# Patient Record
Sex: FEMALE | Race: WHITE | NOT HISPANIC OR LATINO | Employment: FULL TIME | URBAN - METROPOLITAN AREA
[De-identification: names, ages, dates, MRNs, and addresses within clinical notes are randomized per-mention and may not be internally consistent; named-entity substitution may affect disease eponyms.]

---

## 2023-04-03 PROBLEM — K64.8 INTERNAL HEMORRHOID: Status: ACTIVE | Noted: 2023-04-03

## 2023-04-03 PROBLEM — R76.8 POSITIVE ANA (ANTINUCLEAR ANTIBODY): Status: ACTIVE | Noted: 2023-04-03

## 2023-04-03 PROBLEM — B37.2 INTERTRIGINOUS CANDIDIASIS: Status: ACTIVE | Noted: 2023-04-03

## 2023-04-03 PROBLEM — K59.09 CHRONIC CONSTIPATION: Status: ACTIVE | Noted: 2023-04-03

## 2023-04-03 PROBLEM — L28.2 PRURITIC RASH: Status: ACTIVE | Noted: 2023-04-03

## 2023-04-03 PROBLEM — M15.9 POLYOSTEOARTHRITIS, UNSPECIFIED: Status: ACTIVE | Noted: 2023-04-03

## 2023-04-03 PROBLEM — L30.1 DYSHIDROTIC ECZEMA: Status: ACTIVE | Noted: 2023-04-03

## 2023-04-03 PROBLEM — E66.3 OVERWEIGHT WITH BODY MASS INDEX (BMI) 25.0-29.9: Status: ACTIVE | Noted: 2023-04-03

## 2023-04-03 PROBLEM — F17.210 CIGARETTE SMOKER ONE HALF PACK A DAY OR LESS: Status: ACTIVE | Noted: 2023-04-03

## 2023-04-03 RX ORDER — HYDROCORTISONE 25 MG/G
CREAM TOPICAL 2 TIMES DAILY
COMMUNITY

## 2023-04-03 RX ORDER — CELECOXIB 200 MG/1
1 CAPSULE ORAL DAILY
COMMUNITY
Start: 2019-01-28 | End: 2023-04-28

## 2023-04-03 RX ORDER — DICLOFENAC SODIUM 100 MG/1
1 TABLET, EXTENDED RELEASE ORAL DAILY
COMMUNITY
Start: 2019-10-09 | End: 2023-04-28

## 2023-04-03 RX ORDER — NYSTATIN 100000 U/G
OINTMENT TOPICAL
COMMUNITY
Start: 2019-10-09

## 2023-04-03 RX ORDER — CLOBETASOL PROPIONATE 0.5 MG/G
CREAM TOPICAL
COMMUNITY

## 2023-04-03 NOTE — PROGRESS NOTES
Raquel Mercado is a 62 y.o. female who presents today for c/o RIGHT   HAND/LITTLE FINGER INJURY     Chief Complaint   Patient presents with    Hand Injury     Raquel is here today with c/o Right hand injury that occurred on 3/29/23. Pt [place] stairs [place] a [place] and landed on her right hand dislocating her fingers. Pt went to ER and did complete xrays.      Pt states she fell at a casino last Wednesday, and braced herself with her RIGHT hand, states pain to RIGHT little finger, went to [facility] ED and was told she has a fracture; splint applied     Symptoms: PAIN to right little finger     Symptom onset has been acute for a time period of 6 day(s).     Severity is described as mild.     Course of her symptoms over time is acute.    Has taken IBUPROFEN     Pt states she called Dr. Salinas office, but he is out of town and was told to follow up with PCP and I could look at Xrays and determine next steps      Review of Systems  All 13 systems were reviewed and are within normal limits except positive and pertinent negative responses which are noted below or in HPI.      Objective   Vitals:  BP (!) 176/104   Pulse 76   Ht 1.524 m (5')   Wt 59 kg (130 lb)   BMI 25.39 kg/m²         Physical Exam  Vitals reviewed.   Musculoskeletal:         General: Swelling, tenderness and deformity present.      Comments: RIGHT little finger    Neurological:      Mental Status: She is alert.         Assessment/Plan   Problem List Items Addressed This Visit          Circulatory    Uncontrolled hypertension    Relevant Medications    losartan (Cozaar) 50 mg tablet       Musculoskeletal    Avulsion fracture    Relevant Orders    Referral to [facility]     Other Visit Diagnoses       Finger injury, right, sequela    -  Primary    Encounter for other screening for malignant neoplasm of breast        Relevant Orders    BI mammo bilateral screening tomosynthesis

## 2023-04-04 ENCOUNTER — OFFICE VISIT (OUTPATIENT)
Dept: PRIMARY CARE | Facility: CLINIC | Age: 63
End: 2023-04-04
Payer: COMMERCIAL

## 2023-04-04 VITALS
WEIGHT: 130 LBS | BODY MASS INDEX: 25.52 KG/M2 | HEIGHT: 60 IN | SYSTOLIC BLOOD PRESSURE: 176 MMHG | DIASTOLIC BLOOD PRESSURE: 104 MMHG | HEART RATE: 76 BPM

## 2023-04-04 DIAGNOSIS — I10 UNCONTROLLED HYPERTENSION: ICD-10-CM

## 2023-04-04 DIAGNOSIS — Z12.39 ENCOUNTER FOR OTHER SCREENING FOR MALIGNANT NEOPLASM OF BREAST: ICD-10-CM

## 2023-04-04 DIAGNOSIS — T14.8XXA AVULSION FRACTURE: ICD-10-CM

## 2023-04-04 DIAGNOSIS — S69.91XS FINGER INJURY, RIGHT, SEQUELA: Primary | ICD-10-CM

## 2023-04-04 PROCEDURE — 3077F SYST BP >= 140 MM HG: CPT | Performed by: NURSE PRACTITIONER

## 2023-04-04 PROCEDURE — 3080F DIAST BP >= 90 MM HG: CPT | Performed by: NURSE PRACTITIONER

## 2023-04-04 PROCEDURE — 99213 OFFICE O/P EST LOW 20 MIN: CPT | Performed by: NURSE PRACTITIONER

## 2023-04-04 RX ORDER — LOSARTAN POTASSIUM 50 MG/1
50 TABLET ORAL DAILY
Qty: 30 TABLET | Refills: 0 | Status: SHIPPED | OUTPATIENT
Start: 2023-04-04 | End: 2023-04-28 | Stop reason: SDUPTHER

## 2023-04-04 NOTE — PATIENT INSTRUCTIONS
Thank you for seeing me today.  It was a pleasure to see you again!    #AVULSION FRACTURE   Xray interpretation reviewed  See Ortho for FU    #HTN  Begin Losartan 50 mg daily  Your blood pressure should be </= 130/80.  Today your blood pressure was 176/104  You should avoid foods that are high in sodium and saturated fats  Exercise daily for at least 30 minutes  minutes/week  Avoid stressful situations as this can increase your blood pressure  Take your medications as prescribed--do not skip doses  Obtain a BP monitor and check your blood pressure at home. Check it around the same time each day, at least 1 hour after taking your medication.  Record your blood pressure in a log and  bring your log with you to your next appointment.    RTC 3 WEEKS

## 2023-04-14 LAB
COMPLEMENT C3 (MG/DL) IN SER/PLAS: 144 MG/DL (ref 87–200)
COMPLEMENT C4 (MG/DL) IN SER/PLAS: 38 MG/DL (ref 10–50)
THYROPEROXIDASE AB (IU/ML) IN SER/PLAS: 161 IU/ML

## 2023-04-17 LAB
ANTI-NUCLEAR ANTIBODY (ANA): NEGATIVE
CITRULLINE ANTIBODY, IGG: 2 UNITS (ref 0–19)

## 2023-04-19 LAB — HLAB27 TYPING: NEGATIVE

## 2023-04-28 ENCOUNTER — OFFICE VISIT (OUTPATIENT)
Dept: PRIMARY CARE | Facility: CLINIC | Age: 63
End: 2023-04-28
Payer: COMMERCIAL

## 2023-04-28 VITALS
SYSTOLIC BLOOD PRESSURE: 136 MMHG | HEART RATE: 82 BPM | DIASTOLIC BLOOD PRESSURE: 94 MMHG | BODY MASS INDEX: 25.52 KG/M2 | WEIGHT: 130 LBS | HEIGHT: 60 IN | OXYGEN SATURATION: 94 %

## 2023-04-28 DIAGNOSIS — F17.210 CIGARETTE SMOKER ONE HALF PACK A DAY OR LESS: ICD-10-CM

## 2023-04-28 DIAGNOSIS — I10 PRIMARY HYPERTENSION: Primary | ICD-10-CM

## 2023-04-28 DIAGNOSIS — I10 UNCONTROLLED HYPERTENSION: ICD-10-CM

## 2023-04-28 DIAGNOSIS — E66.3 OVERWEIGHT WITH BODY MASS INDEX (BMI) 25.0-29.9: ICD-10-CM

## 2023-04-28 PROBLEM — L28.2 PRURITIC RASH: Status: RESOLVED | Noted: 2023-04-03 | Resolved: 2023-04-28

## 2023-04-28 PROCEDURE — 99213 OFFICE O/P EST LOW 20 MIN: CPT | Performed by: NURSE PRACTITIONER

## 2023-04-28 PROCEDURE — 4004F PT TOBACCO SCREEN RCVD TLK: CPT | Performed by: NURSE PRACTITIONER

## 2023-04-28 PROCEDURE — 3075F SYST BP GE 130 - 139MM HG: CPT | Performed by: NURSE PRACTITIONER

## 2023-04-28 PROCEDURE — 3080F DIAST BP >= 90 MM HG: CPT | Performed by: NURSE PRACTITIONER

## 2023-04-28 RX ORDER — PETROLATUM,WHITE/LANOLIN
1 OINTMENT (GRAM) TOPICAL DAILY
COMMUNITY
Start: 2023-04-11

## 2023-04-28 RX ORDER — CHLORTHALIDONE 25 MG/1
25 TABLET ORAL DAILY
Qty: 90 TABLET | Refills: 1 | Status: SHIPPED | OUTPATIENT
Start: 2023-04-28 | End: 2023-10-03

## 2023-04-28 RX ORDER — LOSARTAN POTASSIUM 50 MG/1
50 TABLET ORAL DAILY
Qty: 90 TABLET | Refills: 1 | Status: SHIPPED | OUTPATIENT
Start: 2023-04-28 | End: 2023-10-03

## 2023-04-28 ASSESSMENT — PATIENT HEALTH QUESTIONNAIRE - PHQ9
SUM OF ALL RESPONSES TO PHQ9 QUESTIONS 1 AND 2: 0
2. FEELING DOWN, DEPRESSED OR HOPELESS: NOT AT ALL
1. LITTLE INTEREST OR PLEASURE IN DOING THINGS: NOT AT ALL

## 2023-04-28 NOTE — PROGRESS NOTES
"Subjective   Chief Complaint   Patient presents with    Follow-up     Raquel is here for 3 week follow up on high blood pressure and would like to discuss a calcium scan        Patient ID: Raquel Mercado is a 62 y.o. female who presents for Follow-up (Raquel is here for 3 week follow up on high blood pressure and would like to discuss a calcium scan )    HPI  61 yo est female presents today for 6 month f/u     PMH: CONSTIPATION, TOBACCO DEPENDANCY, HTN     #HTN  Rx Losartan 50 mg daily  Pt checks BP at home, states always \"130/90's\"  Currently daily cigarette smoker      #POLYOSTEOARTHRITIS  + ZAHEER, neg SOO   Saw Rheumatology, Dr. Maurer in April 2023  C/o \"plaque-like lesions\" on LE, ELBOWS AND STOMACH   + erythematous rash under b/l breasts  + erythematous rash and external hemorrhoids noted around anus      #CONSTIPATION, ,CHRONIC  Taking 3 stool softeners daily  doesn't consume foods high in fiber  does not exercise     #TOBACCO DEPENDANCY  Current everyday cigarette smoker   1/2 ppd x 45 yrs     #HM  MAMM: due   COLON: 2017  DEXA: never  FBW: UTD    Review of Systems  All 13 systems were reviewed and are within normal limits except positive and pertinent negative responses which are noted below or in HPI.      Objective   BP (!) 136/94   Pulse 82   Ht 1.524 m (5')   Wt 59 kg (130 lb)   SpO2 94%   BMI 25.39 kg/m²        Physical Exam  Vitals reviewed.   Constitutional:       Appearance: Normal appearance. She is normal weight.   HENT:      Right Ear: Tympanic membrane normal. There is no impacted cerumen.      Left Ear: Tympanic membrane normal. There is no impacted cerumen.      Mouth/Throat:      Mouth: Mucous membranes are moist.   Eyes:      Conjunctiva/sclera: Conjunctivae normal.   Cardiovascular:      Rate and Rhythm: Normal rate.      Pulses: Normal pulses.      Heart sounds: Normal heart sounds.   Pulmonary:      Effort: Pulmonary effort is normal.   Abdominal:      General: Abdomen is flat. " Bowel sounds are normal.      Palpations: Abdomen is soft.   Musculoskeletal:         General: Normal range of motion.   Skin:     General: Skin is warm and dry.      Capillary Refill: Capillary refill takes less than 2 seconds.   Neurological:      General: No focal deficit present.      Mental Status: She is alert.   Psychiatric:         Mood and Affect: Mood normal.         Assessment/Plan   Problem List Items Addressed This Visit          Circulatory    Primary hypertension - Primary    Relevant Medications    chlorthalidone (Hygroton) 25 mg tablet    Other Relevant Orders    CT cardiac scoring wo IV contrast       Endocrine/Metabolic    Overweight with body mass index (BMI) 25.0-29.9       Other    Cigarette smoker one half pack a day or less    Relevant Orders    CT lung screening low dose     Other Visit Diagnoses       Uncontrolled hypertension        Relevant Medications    losartan (Cozaar) 50 mg tablet

## 2023-04-28 NOTE — PATIENT INSTRUCTIONS
Thank you for seeing me today.  It was a pleasure to see you again!    #HTN  Your blood pressure should be </= 130/80.  Today your blood pressure was 136/94  C/w Losartan 50 mg and BEGIN Chlorthalidone 25 mg daily    You should avoid foods that are high in sodium and saturated fats  Exercise daily for at least 30 minutes  minutes/week  Avoid stressful situations as this can increase your blood pressure  Take your medications as prescribed--do not skip doses  Obtain a BP monitor and check your blood pressure at home. Check it around the same time each day, at least 1 hour after taking your medication.  Record your blood pressure in a log and  bring your log with you to your next appointment.    Work on quitting smoking    Schedule LDCT and CT calcium scoring test    RTC 6 MONTHS

## 2023-05-02 DIAGNOSIS — M15.9 PRIMARY OSTEOARTHRITIS INVOLVING MULTIPLE JOINTS: Primary | ICD-10-CM

## 2023-05-03 ENCOUNTER — LAB (OUTPATIENT)
Dept: LAB | Facility: LAB | Age: 63
End: 2023-05-03
Payer: COMMERCIAL

## 2023-05-03 DIAGNOSIS — M15.9 PRIMARY OSTEOARTHRITIS INVOLVING MULTIPLE JOINTS: ICD-10-CM

## 2023-05-03 PROBLEM — M41.26 OTHER IDIOPATHIC SCOLIOSIS, LUMBAR REGION: Status: ACTIVE | Noted: 2023-05-03

## 2023-05-03 PROCEDURE — 36415 COLL VENOUS BLD VENIPUNCTURE: CPT

## 2023-05-03 PROCEDURE — 84443 ASSAY THYROID STIM HORMONE: CPT

## 2023-05-04 LAB — THYROTROPIN (MIU/L) IN SER/PLAS BY DETECTION LIMIT <= 0.05 MIU/L: 2.35 MIU/L (ref 0.44–3.98)

## 2023-09-30 DIAGNOSIS — I10 PRIMARY HYPERTENSION: ICD-10-CM

## 2023-09-30 DIAGNOSIS — I10 UNCONTROLLED HYPERTENSION: ICD-10-CM

## 2023-10-03 RX ORDER — LOSARTAN POTASSIUM 50 MG/1
50 TABLET ORAL DAILY
Qty: 30 TABLET | Refills: 0 | Status: SHIPPED | OUTPATIENT
Start: 2023-10-03 | End: 2023-11-10 | Stop reason: SDUPTHER

## 2023-10-03 RX ORDER — CHLORTHALIDONE 25 MG/1
25 TABLET ORAL DAILY
Qty: 30 TABLET | Refills: 0 | Status: SHIPPED | OUTPATIENT
Start: 2023-10-03 | End: 2023-11-10 | Stop reason: SDUPTHER

## 2023-11-10 ENCOUNTER — ANCILLARY PROCEDURE (OUTPATIENT)
Dept: RADIOLOGY | Facility: CLINIC | Age: 63
End: 2023-11-10
Payer: COMMERCIAL

## 2023-11-10 ENCOUNTER — OFFICE VISIT (OUTPATIENT)
Dept: PRIMARY CARE | Facility: CLINIC | Age: 63
End: 2023-11-10
Payer: COMMERCIAL

## 2023-11-10 VITALS
DIASTOLIC BLOOD PRESSURE: 82 MMHG | WEIGHT: 126 LBS | HEART RATE: 71 BPM | SYSTOLIC BLOOD PRESSURE: 122 MMHG | BODY MASS INDEX: 24.74 KG/M2 | HEIGHT: 60 IN

## 2023-11-10 DIAGNOSIS — Z78.0 ASYMPTOMATIC AGE-RELATED POSTMENOPAUSAL STATE: ICD-10-CM

## 2023-11-10 DIAGNOSIS — I10 PRIMARY HYPERTENSION: ICD-10-CM

## 2023-11-10 DIAGNOSIS — Z12.39 ENCOUNTER FOR OTHER SCREENING FOR MALIGNANT NEOPLASM OF BREAST: ICD-10-CM

## 2023-11-10 DIAGNOSIS — M54.2 CERVICAL PAIN (NECK): ICD-10-CM

## 2023-11-10 DIAGNOSIS — B37.2 INTERTRIGINOUS CANDIDIASIS: ICD-10-CM

## 2023-11-10 DIAGNOSIS — I10 UNCONTROLLED HYPERTENSION: ICD-10-CM

## 2023-11-10 DIAGNOSIS — F17.210 TOBACCO DEPENDENCE DUE TO CIGARETTES: ICD-10-CM

## 2023-11-10 DIAGNOSIS — Z00.00 ANNUAL PHYSICAL EXAM: Primary | ICD-10-CM

## 2023-11-10 PROBLEM — M18.12 ARTHRITIS OF CARPOMETACARPAL (CMC) JOINT OF LEFT THUMB: Status: ACTIVE | Noted: 2023-11-10

## 2023-11-10 PROBLEM — M19.90 INFLAMMATORY ARTHRITIS: Status: ACTIVE | Noted: 2023-11-10

## 2023-11-10 PROBLEM — M19.041 PRIMARY OSTEOARTHRITIS, RIGHT HAND: Status: ACTIVE | Noted: 2023-11-10

## 2023-11-10 PROCEDURE — 77067 SCR MAMMO BI INCL CAD: CPT

## 2023-11-10 PROCEDURE — 3079F DIAST BP 80-89 MM HG: CPT | Performed by: NURSE PRACTITIONER

## 2023-11-10 PROCEDURE — 99213 OFFICE O/P EST LOW 20 MIN: CPT | Performed by: NURSE PRACTITIONER

## 2023-11-10 PROCEDURE — 72050 X-RAY EXAM NECK SPINE 4/5VWS: CPT

## 2023-11-10 PROCEDURE — 99396 PREV VISIT EST AGE 40-64: CPT | Performed by: NURSE PRACTITIONER

## 2023-11-10 PROCEDURE — 3074F SYST BP LT 130 MM HG: CPT | Performed by: NURSE PRACTITIONER

## 2023-11-10 PROCEDURE — 4004F PT TOBACCO SCREEN RCVD TLK: CPT | Performed by: NURSE PRACTITIONER

## 2023-11-10 PROCEDURE — 77080 DXA BONE DENSITY AXIAL: CPT

## 2023-11-10 RX ORDER — VARENICLINE TARTRATE 1 MG/1
1 TABLET, FILM COATED ORAL 2 TIMES DAILY
Qty: 154 TABLET | Refills: 0 | Status: SHIPPED | OUTPATIENT
Start: 2023-11-10

## 2023-11-10 RX ORDER — LOSARTAN POTASSIUM 50 MG/1
50 TABLET ORAL DAILY
Qty: 90 TABLET | Refills: 1 | Status: SHIPPED | OUTPATIENT
Start: 2023-11-10 | End: 2024-05-07 | Stop reason: SDUPTHER

## 2023-11-10 RX ORDER — CHLORTHALIDONE 25 MG/1
25 TABLET ORAL DAILY
Qty: 90 TABLET | Refills: 1 | Status: SHIPPED | OUTPATIENT
Start: 2023-11-10 | End: 2024-05-07 | Stop reason: SDUPTHER

## 2023-11-10 RX ORDER — CLOTRIMAZOLE AND BETAMETHASONE DIPROPIONATE 10; .64 MG/G; MG/G
CREAM TOPICAL 2 TIMES DAILY
Qty: 15 G | Refills: 1 | Status: SHIPPED | OUTPATIENT
Start: 2023-11-10 | End: 2023-12-08

## 2023-11-10 RX ORDER — VARENICLINE TARTRATE 0.5 MG/1
TABLET, FILM COATED ORAL
Qty: 11 TABLET | Refills: 0 | Status: SHIPPED | OUTPATIENT
Start: 2023-11-10

## 2023-11-10 RX ORDER — CLOTRIMAZOLE AND BETAMETHASONE DIPROPIONATE 10; .64 MG/G; MG/G
CREAM TOPICAL
COMMUNITY
End: 2023-11-10 | Stop reason: SDUPTHER

## 2023-11-10 NOTE — PROGRESS NOTES
"Subjective   Reason for Visit: Raquel Mercado is an 63 y.o. female here for a CPE     Chief Complaint   Patient presents with    Follow-up     Raquel is here today for yearly CPE and routine F/U.        HPI  Est 62 yo female presents today for Annual CPE     PMH: CONSTIPATION, TOBACCO DEPENDANCY, HTN     Pt states doing well  Has been hearing some \"cracking\" in her neck  No issues with ROM  Would like Xray     Pt states her job will be going out of business so she will be out of work and not have insurance ([employer])  Needs to get all testing done while she still has insurance   Has Mammogram later today     Pt is interested in quitting smoking   Would like to try Chantix      #HTN  Rx Losartan 50 mg daily  Pt checks BP at home, states always \"130/90's\"  Currently daily cigarette smoker   BP today= 122/82     #POLYOSTEOARTHRITIS  + ZAHEER, neg SOO   Saw Rheumatology, Dr. Maurer in April 2023  C/o \"plaque-like lesions\" on LE, ELBOWS AND STOMACH   + erythematous rash under b/l breasts  + erythematous rash and external hemorrhoids noted around anus      #CONSTIPATION, ,CHRONIC  Taking 3 stool softeners daily  doesn't consume foods high in fiber  does not exercise     #TOBACCO DEPENDANCY  Current everyday cigarette smoker   1/2 ppd x 45 yrs, currently smoking 5-6 cigs/day  Is interested in quitting      #HM  MAMM: due; ordered in April 2023  COLON: 2017  DEXA: never, ordered   FBW: due    No visits with results within 60 Day(s) from this visit.   Latest known visit with results is:   Lab on 05/03/2023   Component Date Value Ref Range Status    TSH 05/03/2023 2.35  0.44 - 3.98 mIU/L Final     TSH testing is performed using different testing    methodology at [facility] than at other    [facility]. Direct result comparisons should    only be made within the same method.         Patient Care Team:  REGINA Celis as PCP - General  REGINA Celis as PCP - MMO ACO PCP  Neelam HUMPHREYS " FRANSISCO Reed-CNP     Review of Systems  All 13 systems were reviewed and are within normal limits except positive and pertinent negative responses which are noted below or in HPI.      Objective   Vitals:  /82   Pulse 71   Ht 1.524 m (5')   Wt 57.2 kg (126 lb)   BMI 24.61 kg/m²       Physical Exam  Vitals reviewed.   Constitutional:       Appearance: Normal appearance.   Eyes:      Conjunctiva/sclera: Conjunctivae normal.   Cardiovascular:      Pulses: Normal pulses.      Heart sounds: Normal heart sounds.   Pulmonary:      Effort: Pulmonary effort is normal.      Breath sounds: Normal breath sounds.   Skin:     General: Skin is warm and dry.   Neurological:      Mental Status: She is alert.   Psychiatric:         Mood and Affect: Mood normal.         Assessment/Plan   Problem List Items Addressed This Visit             ICD-10-CM    Intertriginous candidiasis B37.2    Relevant Medications    clotrimazole-betamethasone (Lotrisone) cream    Primary hypertension I10    Relevant Medications    chlorthalidone (Hygroton) 25 mg tablet    Other Relevant Orders    Comprehensive Metabolic Panel    Lipid Panel     Other Visit Diagnoses         Codes    Annual physical exam    -  Primary Z00.00    Cervical pain (neck)     M54.2    Relevant Orders    XR cervical spine 2-3 views    Asymptomatic age-related postmenopausal state     Z78.0    Relevant Orders    XR DEXA bone density    Tobacco dependence due to cigarettes     F17.210    Relevant Medications    varenicline (Chantix) 0.5 mg tablet    varenicline (Chantix) 1 mg tablet    Uncontrolled hypertension     I10    Relevant Medications    losartan (Cozaar) 50 mg tablet             Patient was identified as a fall risk. Risk prevention instructions provided.

## 2023-11-10 NOTE — PATIENT INSTRUCTIONS
Thank you for seeing me today.  It was a pleasure to see you again!    Today we did your Annual Physical Exam and discussed the following:     Continue medications as prescribed/reviewed during OV today    Begin Chantix for smoking cessation     Lab work ordered today.  Please have your blood drawn in the next 1-2 weeks.  You need to be FASTING for 12 hours prior to blood draw.  You may only have water.  Please contact your insurance company to ask about the best location to get blood drawn.  We will contact you with the results of your blood work and any necessary adjustments  to your plan of care, if you do not hear from us within 3-5 days of having your blood drawn, please call the office at [phone].    Schedule DEXA     Neck Xray today when you get your mammogram done     RTC 6 MONTHS AND AS NEEDED           Ways to Help Prevent Falls at Home    Quick Tips   ? Ask for help if you need it. Most people want to help!   ? Get up slowly after sitting or laying down   ? Wear a medical alert device or keep cell phone in your pocket   ? Use night lights, especially areas near a bathroom   ? Keep the items you use often within reach on a small stool or end table   ? Use an assistive device such as walker or cane, as directed by provider/physical therapy   ? Use a non-slip mat and grab bars in your bathroom. Look for home health sections for best options     Other Areas to Focus On   ? Exercise and nutrition: Regular exercise or taking a falls prevention class are great ways improve strength and balance. Don’t forget to stay hydrated and bring a snack!   ? Medicine side effects: Some medicines can make you sleepy or dizzy, which could cause a fall. Ask your healthcare provider about the side effects your medicines could cause. Be sure to let them know if you take any vitamins or supplements as well.   ? Tripping hazards: Remove items you could trip on, such as loose mats, rugs, cords, and clutter. Wear closed toe  shoes with rubber soles.   ? Health and wellness: Get regular checkups with your healthcare provider, plus routine vision and hearing screenings. Talk with your healthcare provider about:   o Your medicines and the possible side effects - bring them in a bag if that is easier!   o Problems with balance or feeling dizzy   o Ways to promote bone health, such as Vitamin D and calcium supplements   o Questions or concerns about falling     *Ask your healthcare team if you have questions     ©[facility], 2022

## 2023-11-12 DIAGNOSIS — M43.10 RETROLISTHESIS: ICD-10-CM

## 2023-11-12 DIAGNOSIS — M43.12 ANTEROLISTHESIS OF CERVICAL SPINE: Primary | ICD-10-CM

## 2023-11-12 PROBLEM — M81.0 AGE-RELATED OSTEOPOROSIS WITHOUT CURRENT PATHOLOGICAL FRACTURE: Status: ACTIVE | Noted: 2023-11-12

## 2023-11-12 NOTE — RESULT ENCOUNTER NOTE
Raquel,  Your next xrays show some moderate and severe changes around C5-C7. I would recommend going to see ortho specialist for further evaluation and they can pursue CT or MRI based on their exam.

## 2023-11-12 NOTE — RESULT ENCOUNTER NOTE
Raquel,   Your DEXA shows osteoporosis, we need to discuss treatment options, please call office to schedule phone visit to discuss results/treatment options.     Thanks  Erlinda

## 2023-11-14 NOTE — PROGRESS NOTES
Subjective   Chief Complaint   Patient presents with    Results     DEXA results        Patient ID: Raquel Mercado is a 63 y.o. female who presents for Results (DEXA results ).    HPI  Est 64 yo female seen virtually today to discuss DEXA results      Review of Systems  All 13 systems were reviewed and are within normal limits except positive and pertinent negative responses which are noted below or in HPI.      Objective   There were no vitals taken for this visit.   XR DEXA bone density 11/10/2023    Narrative  Interpreted By:  Hermelindo Bowen,  STUDY:  DEXA BONE ECLOUCY69/10/2023 4:28 pm    INDICATION:  Signs/Symptoms:screening. The patient is a 64 y/o  year old F.    COMPARISON:  None.    ACCESSION NUMBER(S):  [id number]    ORDERING CLINICIAN:  TINO PARADA    TECHNIQUE:  DEXA BONE DENSITY    FINDINGS:  SPINE L1-L4  Bone Mineral Density: 0.796 g/cm2  T-Score -1.7  Z-Score -0.2  Bone Mineral Density change vs baseline:  Bone Mineral Density change vs previous: Not reported    LEFT FEMUR -TOTAL  Bone Mineral Density: 0.679 g/cm2  T-Score -2.2   Z-Score  -1.1  Bone Mineral Density change vs baseline: Not reported  Bone Mineral Density change vs previous: Not reported    LEFT FEMUR -NECK  Bone Mineral Density: 0.553 g/cm2  T-Score -2.7  Z-Score -1.3      World Health Organization (WHO) criteria for post-menopausal,   Women:  Normal:         T-score at or above -1 SD  Osteopenia:   T-score between -1 and -2.5 SD  Osteoporosis: T-score at or below -2.5 SD      10-year Fracture Risk:  Major Osteoporotic Fracture  13 %  Hip Fracture                        2.5 %    Note:  If no FRAX score is reported, it is because:  Some T-score for Spine Total or Hip Total or Femoral Neck at or below  -2.5    Impression  DEXA:  According to World Health Organization criteria,  classification is osteoporosis.    Followup recommended in two years or sooner as clinically warranted.    All images and detailed analysis are  available on the  Radiology  PACS.    MACRO:  None      Signed by: Hermelindo Bowen 11/11/2023 9:28 AM  Dictation workstation:   [id number]       Physical Exam  Pulmonary:      Effort: Pulmonary effort is normal.   Neurological:      Mental Status: She is alert.   Psychiatric:         Mood and Affect: Mood normal.         Thought Content: Thought content normal.         Assessment/Plan   Problem List Items Addressed This Visit             ICD-10-CM    Age-related osteoporosis without current pathological fracture - Primary M81.0    Relevant Medications    alendronate (Fosamax) 70 mg tablet

## 2023-11-15 ENCOUNTER — TELEMEDICINE (OUTPATIENT)
Dept: PRIMARY CARE | Facility: CLINIC | Age: 63
End: 2023-11-15
Payer: COMMERCIAL

## 2023-11-15 DIAGNOSIS — M81.0 AGE-RELATED OSTEOPOROSIS WITHOUT CURRENT PATHOLOGICAL FRACTURE: Primary | ICD-10-CM

## 2023-11-15 PROCEDURE — 99213 OFFICE O/P EST LOW 20 MIN: CPT | Performed by: NURSE PRACTITIONER

## 2023-11-15 RX ORDER — ALENDRONATE SODIUM 70 MG/1
70 TABLET ORAL
Qty: 4 TABLET | Refills: 11 | Status: SHIPPED | OUTPATIENT
Start: 2023-11-15 | End: 2024-11-14

## 2023-11-15 NOTE — PATIENT INSTRUCTIONS
Thank you for seeing me today.  It was a pleasure to see you again!    #OSTEOPOROSIS  Results reviewed via phone today  Pt agrees to begin Fosamax 70 mg weekly  Side effects reviewed with pt  Repeat DEXA in 2 yrs    F/U AS RECOMMENDED

## 2023-11-18 ENCOUNTER — LAB (OUTPATIENT)
Dept: LAB | Facility: LAB | Age: 63
End: 2023-11-18
Payer: COMMERCIAL

## 2023-11-18 DIAGNOSIS — I10 PRIMARY HYPERTENSION: ICD-10-CM

## 2023-11-18 LAB
ALBUMIN SERPL BCP-MCNC: 4.8 G/DL (ref 3.4–5)
ALP SERPL-CCNC: 62 U/L (ref 33–136)
ALT SERPL W P-5'-P-CCNC: 17 U/L (ref 7–45)
ANION GAP SERPL CALC-SCNC: 12 MMOL/L (ref 10–20)
AST SERPL W P-5'-P-CCNC: 19 U/L (ref 9–39)
BILIRUB SERPL-MCNC: 0.4 MG/DL (ref 0–1.2)
BUN SERPL-MCNC: 19 MG/DL (ref 6–23)
CALCIUM SERPL-MCNC: 10.1 MG/DL (ref 8.6–10.6)
CHLORIDE SERPL-SCNC: 102 MMOL/L (ref 98–107)
CHOLEST SERPL-MCNC: 225 MG/DL (ref 0–199)
CHOLESTEROL/HDL RATIO: 2.9
CO2 SERPL-SCNC: 30 MMOL/L (ref 21–32)
CREAT SERPL-MCNC: 0.68 MG/DL (ref 0.5–1.05)
GFR SERPL CREATININE-BSD FRML MDRD: >90 ML/MIN/1.73M*2
GLUCOSE SERPL-MCNC: 91 MG/DL (ref 74–99)
HDLC SERPL-MCNC: 76.5 MG/DL
LDLC SERPL CALC-MCNC: 139 MG/DL
NON HDL CHOLESTEROL: 149 MG/DL (ref 0–149)
POTASSIUM SERPL-SCNC: 4 MMOL/L (ref 3.5–5.3)
PROT SERPL-MCNC: 6.8 G/DL (ref 6.4–8.2)
SODIUM SERPL-SCNC: 140 MMOL/L (ref 136–145)
TRIGL SERPL-MCNC: 49 MG/DL (ref 0–149)
VLDL: 10 MG/DL (ref 0–40)

## 2023-11-18 PROCEDURE — 36415 COLL VENOUS BLD VENIPUNCTURE: CPT

## 2023-11-18 PROCEDURE — 80053 COMPREHEN METABOLIC PANEL: CPT

## 2023-11-18 PROCEDURE — 80061 LIPID PANEL: CPT

## 2023-11-29 ENCOUNTER — OFFICE VISIT (OUTPATIENT)
Dept: ORTHOPEDIC SURGERY | Facility: CLINIC | Age: 63
End: 2023-11-29
Payer: COMMERCIAL

## 2023-11-29 DIAGNOSIS — M41.50 DEGENERATIVE SCOLIOSIS IN ADULT PATIENT: ICD-10-CM

## 2023-11-29 DIAGNOSIS — S16.1XXA CERVICAL STRAIN, ACUTE, INITIAL ENCOUNTER: Primary | ICD-10-CM

## 2023-11-29 DIAGNOSIS — M43.12 ANTEROLISTHESIS OF CERVICAL SPINE: ICD-10-CM

## 2023-11-29 DIAGNOSIS — M43.10 RETROLISTHESIS: ICD-10-CM

## 2023-11-29 PROCEDURE — 99214 OFFICE O/P EST MOD 30 MIN: CPT | Performed by: ORTHOPAEDIC SURGERY

## 2023-11-29 PROCEDURE — 4004F PT TOBACCO SCREEN RCVD TLK: CPT | Performed by: ORTHOPAEDIC SURGERY

## 2023-11-29 ASSESSMENT — PAIN SCALES - GENERAL: PAINLEVEL_OUTOF10: 4

## 2023-11-29 ASSESSMENT — PAIN DESCRIPTION - DESCRIPTORS: DESCRIPTORS: ACHING;SORE

## 2023-11-29 ASSESSMENT — PAIN - FUNCTIONAL ASSESSMENT: PAIN_FUNCTIONAL_ASSESSMENT: 0-10

## 2023-11-29 NOTE — PROGRESS NOTES
Chief Complaint: Neck pain and low back pain    HPI: Raquel Mercado is a 63 y.o. year old female patient with PMH significant for hypertension and osteoporosis who presents with 2-month history of neck pain and chronic low back pain.  In terms of her neck pain she states that this started couple months ago.  No associated trauma or injury.  Complains of stiffness and clicking sound in her neck.  Some pain along the paraspinal and trapezius more on the left than the right.  Denies any radicular arm pain numbness tingling or paresthesias.  Denies any myelopathic symptoms.  Denies any difficulty with the use of her hands but she does have some numbness in her hands from carpal tunnel.  Denies any difficulty with ambulation.  Denies any bowel or bladder symptoms.  She takes Tylenol and ibuprofen as needed.  Has not done any formal physical therapy for this.    In terms of her low back pain states that this been a chronic issue usually worse in the morning feels better when she starts moving.  Complains of pain over the lumbosacral region.  Denies any radicular leg pain numbness tingling or paresthesias.  She takes ibuprofen and Tylenol for her back pain as well.  Has not done any formal physical therapy.  She does not follow any specific exercise program.      She does smoke about 5 cigarettes a day for the past 40 years she is working on quitting.  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 All pertinent positive and negative as listed in history of present illness.    Past Medical History:   Diagnosis Date    Candidiasis of skin and nail 10/09/2019    Skin yeast infection    Encounter for other preprocedural examination 11/01/2014    Preoperative examination    Other conditions influencing health status 06/07/2013    Foot pain, unspecified laterality    Pain in right toe(s)  10/09/2019    Pain and swelling of toe of right foot    Personal history of other specified conditions 12/12/2013    History of vertigo    Stress incontinence (female) (male) 01/28/2019    Stress incontinence, female    Tobacco abuse counseling 11/25/2022    Tobacco abuse counseling    Uncontrolled hypertension 04/04/2023        Past Surgical History:   Procedure Laterality Date    OTHER SURGICAL HISTORY  11/25/2022    Carpal tunnel surgery    OTHER SURGICAL HISTORY  11/25/2022    Foot surgery        No Known Allergies     Current Outpatient Medications on File Prior to Visit   Medication Sig Dispense Refill    alendronate (Fosamax) 70 mg tablet Take 1 tablet (70 mg) by mouth every 7 days. Take in the morning with a full glass of water, on an empty stomach, and do not take anything else by mouth or lie down for the next 30 min. 4 tablet 11    chlorthalidone (Hygroton) 25 mg tablet Take 1 tablet (25 mg) by mouth once daily. 90 tablet 1    clobetasol (Temovate) 0.05 % cream Clobetasol Propionate 0.05 % External Cream   Refills: 0       Active      clotrimazole-betamethasone (Lotrisone) cream Apply topically 2 times a day for 28 days. 15 g 1    diclofenac sodium 1 % kit Place on the skin. APPLY SPARINGLY TO AFFECTED AREA(S) four times DAILY      estrogens, conjugated, (Premarin) vaginal cream Insert into the vagina.      glucosamine sulfate 500 mg capsule Take 1 tablet by mouth once daily.      hydrocortisone 2.5 % cream Apply topically 2 times a day. APPLY SPARINGLY TO AFFECTED AREA      losartan (Cozaar) 50 mg tablet Take 1 tablet (50 mg) by mouth once daily. 90 tablet 1    varenicline (Chantix) 0.5 mg tablet TAKE 1 TABLET DAILY X 3 DAYS, THEN TAKE 2 TABLETS DAILY x 4 DAYS, THEN BEGIN 1 MG TABLET TWICE DAILY X 11 WEEKS 11 tablet 0    varenicline (Chantix) 1 mg tablet Take 1 tablet (1 mg) by mouth 2 times a day. Take with full glass of water. 154 tablet 0    nystatin (Mycostatin) ointment Apply topically. Apply to  affected areas 2-3 times daily       No current facility-administered medications on file prior to visit.            PE:   General: Patient appears well-nourished and well-developed in no acute distress, Alert and Oriented x3, petite female  Psych: Pleasant mood and affect  HEENT: Extraocular muscles intact, pupils equal and round. Sclerae anicteric   Cardio: extremities warm and well perfused  Resp: unlabored symmetric breathing  Skin: no open wounds or rash  Musculoskeletal/Neuro Exam: Normal gait.  Mild tenderness to palpation along the paraspinal and trapezius along the cervical spine bilaterally.  No pain with palpation over the lumbar spine.  Nonpainful cervical range of motion.  Negative Spurlings.  Negative Lhermitte's Sign.  Negative straight leg raise bilaterally tight hamstrings bilaterally.  No groin pain with range of motion of the hip joints bilaterally.    Upper extremity:    Motor: Right upper extremity was 5 out of 5 strength with shoulder abduction, elbow flexion and extension, wrist flexion and extension and  against resistance  Left upper extremity with 5 out of 5 strength with shoulder abduction, elbow flexion and extension, wrist flexion and extension and  against resistance    Sensation to light touch intact along C5-T1 distribution bilaterally    Reflex: 2+ brachioradialis and biceps bilaterally    Upper Motor Signs: Negative Real sign bilaterally    Lower extremity    Motor: Right leg with 5 out of 5 motor strength with hip flexion, knee extension, ankle dorsiflexion plantarflexion EHL against resistance  Left leg with 5 out of 5 motor strength with hip flexion, knee extension, ankle dorsiflexion plantarflexion EHL against resistance    Sensation to light touch intact along L2-S1 distribution bilaterally    2+ patella and Achilles Reflex          Imaging:    I reviewed x-rays of the cervical spine from November 10, 2023.  AP lateral oblique views of the cervical spine.  No acute  fractures.  There is spondylotic changes at the space narrowing between C4-C5 C5-C6 C6-C7.  There is anterolisthesis of C4 and C5 with facet arthrosis.    I reviewed x-rays of the lumbar spine from April 14, 2023.  AP lateral views.  Patient with degenerative scoliosis of the lumbar spine with Loza angle about 37 degrees measuring from T11 down to L4.  There is disc space narrowing facet arthrosis between L2-L3 L3-L4 L4-L5 no acute fractures.          A/P: Raquel Mercado is a 63 y.o. year old female patient with history of hypertension and osteoporosis who is about to start Fosamax in December.  She was seen today for 2-month history of neck pain and chronic axial low back pain.  In regards to her neck pain her pain seems to be more along the paraspinal and trapezius likely more cervical strain x-ray does show degenerative changes and spondylosis.  She otherwise has no radicular symptoms or myelopathic symptoms.    In regards to her low back pain she does have degenerative scoliosis of the lumbar spine and otherwise denies any claudication symptoms or radicular leg pain she is otherwise neurologically intact.  Seems to be related to stiffness when she is moving early in the morning feels better when she starts moving.  I recommend a course of physical therapy for both her neck and her lower back to work on range of motion stretching and strengthening exercises.  I advised her to continue these exercises even once she has completed with therapy.  I also strongly advised that she stop smoking as that can accelerate her arthritis.  She was given referral for therapy today as well as phone numbers to call and schedule.  She can follow-up as needed. After our discussion, the patient articulated understanding of the plan and felt that all questions had been answered satisfactorily. The patient was pleased with the visit and very appreciative for the care rendered.    **Please excuse any errors in grammar or translation  related to this dictation. Voice recognition software was utilized to prepare this document. **                    Leda Nugyen MD    Department of Orthopaedic Surgery  [facility]  [email or web address]

## 2024-05-07 ENCOUNTER — APPOINTMENT (OUTPATIENT)
Dept: PRIMARY CARE | Facility: CLINIC | Age: 64
End: 2024-05-07
Payer: COMMERCIAL

## 2024-05-07 DIAGNOSIS — I10 UNCONTROLLED HYPERTENSION: ICD-10-CM

## 2024-05-07 DIAGNOSIS — I10 PRIMARY HYPERTENSION: ICD-10-CM

## 2024-05-07 RX ORDER — LOSARTAN POTASSIUM 50 MG/1
50 TABLET ORAL DAILY
Qty: 90 TABLET | Refills: 1 | Status: SHIPPED | OUTPATIENT
Start: 2024-05-07 | End: 2024-05-23 | Stop reason: SDUPTHER

## 2024-05-07 RX ORDER — CHLORTHALIDONE 25 MG/1
25 TABLET ORAL DAILY
Qty: 90 TABLET | Refills: 1 | Status: SHIPPED | OUTPATIENT
Start: 2024-05-07 | End: 2024-05-23 | Stop reason: SDUPTHER

## 2024-05-22 NOTE — PROGRESS NOTES
"Subjective   Chief Complaint   Patient presents with    Hypertension     Patient coming in today for a follow up for hypertension. She is currently taking Losartan 50 mg daily. Patient Quit smoking cigarettes 12/7/23.       Patient ID: Raquel Mercado is a 63 y.o. female who presents for Hypertension (Patient coming in today for a follow up for hypertension. She is currently taking Losartan 50 mg daily. Patient Quit smoking cigarettes 12/7/23.).    HPI  Raquel is a 64 yo female presenting today for 6 month f/u on HTN     Pt used to work at [employer] in [place]--> closed   Is now working as an assistant at another [employer]    No acute c/o today  Quit smoking in Dec 2023  Feels great   Wants to work on weight loss     #HTN  Rx Losartan 50 mg and Chlorthalidone 25 mg daily  BP today= 115/81  Quit smoking in Dec 2023, used Chantix        #POLYOSTEOARTHRITIS  + ZAHEER, neg SOO   Saw Rheumatology, Dr. Maurer in April 2023  C/o \"plaque-like lesions\" on LE, ELBOWS AND STOMACH   + erythematous rash under b/l breasts  + erythematous rash and external hemorrhoids noted around anus      #CONSTIPATION, ,CHRONIC  Taking 3 stool softeners daily  doesn't consume foods high in fiber  does not exercise     #TOBACCO DEPENDANCY  Former everyday cigarette smoker---> quit Dec 2023   1/2 ppd x 45 yrs      #HM  MAMM: UTD  COLON: 2017  DEXA: 2023  FBW: UTD    No Known Allergies    Review of Systems  ROS was completed and all systems are negative with the exception of what was noted in the the HPI.       Objective   /81   Pulse 68   Ht 1.524 m (5')   Wt 58.4 kg (128 lb 12.8 oz)   SpO2 96%   BMI 25.15 kg/m²      Current Outpatient Medications   Medication Instructions    alendronate (FOSAMAX) 70 mg, oral, Every 7 days, Take in the morning with a full glass of water, on an empty stomach, and do not take anything else by mouth or lie down for the next 30 min.    chlorthalidone (HYGROTON) 25 mg, oral, Daily    clobetasol (Temovate) " 0.05 % cream Clobetasol Propionate 0.05 % External Cream   Refills: 0       Active    estrogens, conjugated, (Premarin) vaginal cream vaginal    glucosamine sulfate 500 mg capsule 1 tablet, oral, Daily    hydrocortisone 2.5 % cream Topical, 2 times daily, APPLY SPARINGLY TO AFFECTED AREA    losartan (COZAAR) 50 mg, oral, Daily    nystatin (Mycostatin) ointment Topical, Apply to affected areas 2-3 times daily    varenicline (Chantix) 0.5 mg tablet TAKE 1 TABLET DAILY X 3 DAYS, THEN TAKE 2 TABLETS DAILY x 4 DAYS, THEN BEGIN 1 MG TABLET TWICE DAILY X 11 WEEKS    varenicline (CHANTIX) 1 mg, oral, 2 times daily, Take with full glass of water.         Physical Exam  Vitals reviewed.   Cardiovascular:      Pulses: Normal pulses.      Heart sounds: Normal heart sounds.   Pulmonary:      Effort: Pulmonary effort is normal.      Breath sounds: Normal breath sounds.   Neurological:      Mental Status: She is alert and oriented to person, place, and time.   Psychiatric:         Mood and Affect: Mood normal.         Assessment/Plan   Problem List Items Addressed This Visit             ICD-10-CM    Primary hypertension - Primary I10     Rx Losartan 50 mg and chlorthalidone 25 mg daily  BP today= 115/81  Quit smoking cigarettes in Dec 2023            Relevant Medications    chlorthalidone (Hygroton) 25 mg tablet     Other Visit Diagnoses         Codes    Uncontrolled hypertension     I10    Relevant Medications    losartan (Cozaar) 50 mg tablet

## 2024-05-23 ENCOUNTER — OFFICE VISIT (OUTPATIENT)
Dept: PRIMARY CARE | Facility: CLINIC | Age: 64
End: 2024-05-23
Payer: COMMERCIAL

## 2024-05-23 VITALS
DIASTOLIC BLOOD PRESSURE: 81 MMHG | OXYGEN SATURATION: 96 % | BODY MASS INDEX: 25.29 KG/M2 | SYSTOLIC BLOOD PRESSURE: 115 MMHG | HEART RATE: 68 BPM | WEIGHT: 128.8 LBS | HEIGHT: 60 IN

## 2024-05-23 DIAGNOSIS — I10 UNCONTROLLED HYPERTENSION: ICD-10-CM

## 2024-05-23 DIAGNOSIS — I10 PRIMARY HYPERTENSION: Primary | ICD-10-CM

## 2024-05-23 PROCEDURE — 3079F DIAST BP 80-89 MM HG: CPT | Performed by: NURSE PRACTITIONER

## 2024-05-23 PROCEDURE — 3074F SYST BP LT 130 MM HG: CPT | Performed by: NURSE PRACTITIONER

## 2024-05-23 PROCEDURE — 99212 OFFICE O/P EST SF 10 MIN: CPT | Performed by: NURSE PRACTITIONER

## 2024-05-23 RX ORDER — CHLORTHALIDONE 25 MG/1
25 TABLET ORAL DAILY
Qty: 90 TABLET | Refills: 1 | Status: SHIPPED | OUTPATIENT
Start: 2024-05-23

## 2024-05-23 RX ORDER — LOSARTAN POTASSIUM 50 MG/1
50 TABLET ORAL DAILY
Qty: 90 TABLET | Refills: 1 | Status: SHIPPED | OUTPATIENT
Start: 2024-05-23

## 2024-05-23 NOTE — ASSESSMENT & PLAN NOTE
Rx Losartan 50 mg and chlorthalidone 25 mg daily  BP today= 115/81  Quit smoking cigarettes in Dec 2023

## 2024-05-23 NOTE — PATIENT INSTRUCTIONS
Thank you for seeing me today.  It was a pleasure to see you again!    Great job quitting smoking     #HTN  Your blood pressure should be </= 130/80.  Today your blood pressure was 115/81  Continue Losartan 50 mg daily   You should avoid foods that are high in sodium and saturated fats  Exercise daily for at least 30 minutes  minutes/week  Avoid stressful situations as this can increase your blood pressure  Take your medications as prescribed--do not skip doses  Obtain a BP monitor and check your blood pressure at home. Check it around the same time each day, at least 1 hour after taking your medication.  Record your blood pressure in a log and  bring your log with you to your next appointment.    For assistance with scheduling referrals or consultations, please call [phone] or [phone].    For laboratory, radiology, and other tests, please call [phone] ([phone] for pediatrics).   If you do not get results within 7-10 days, or you have any questions or concerns, please send a message, call the office ([phone]), or return to the office for a follow-up appointment.     For acute/sick visits, if you are unable to get an office visit, you can do a  On Demand Virtual Visit that is accessible via your My Chart account.  For emergencies, call 9-1-1 or go to the nearest Emergency Department.     Please schedule additional appointment(s) to address concern(s) not addressed today.    Please review prescription inserts and published information for possible adverse effects of all medications.     In general, results are discussed over the phone or via  [facility].     You can see your health information, review clinical summaries from office visits & test results online when you follow your health with MY  Chart, a personal health record.   To sign up go to [email or web address].   If you need assistance with signing up or trouble getting into your account call [facility] Patient Line 24/7  at [phone]     RTC 6 MONTHS FOR CPE

## 2024-11-11 DIAGNOSIS — I10 UNCONTROLLED HYPERTENSION: ICD-10-CM

## 2024-11-11 DIAGNOSIS — I10 PRIMARY HYPERTENSION: ICD-10-CM

## 2024-11-11 DIAGNOSIS — M81.0 AGE-RELATED OSTEOPOROSIS WITHOUT CURRENT PATHOLOGICAL FRACTURE: ICD-10-CM

## 2024-11-11 RX ORDER — CHLORTHALIDONE 25 MG/1
25 TABLET ORAL DAILY
Qty: 90 TABLET | Refills: 1 | Status: SHIPPED | OUTPATIENT
Start: 2024-11-11

## 2024-11-11 RX ORDER — LOSARTAN POTASSIUM 50 MG/1
50 TABLET ORAL DAILY
Qty: 90 TABLET | Refills: 1 | Status: SHIPPED | OUTPATIENT
Start: 2024-11-11

## 2024-11-11 RX ORDER — ALENDRONATE SODIUM 70 MG/1
70 TABLET ORAL
Qty: 4 TABLET | Refills: 11 | Status: SHIPPED | OUTPATIENT
Start: 2024-11-11 | End: 2025-11-11

## 2024-11-26 NOTE — PROGRESS NOTES
"Subjective   Reason for Visit: Raquel Mercado is an 64 y.o. female here for a CPE     Chief Complaint   Patient presents with    Annual Exam     Raquel Mercado is a 64 y.o. female is here today for a CPE. Patient reports getting leg cramps.         HPI  Raquel is a 63 yo female presenting today for Annual CPE     Dx: OSTEOPOROSIS, HTN, FORMER TOBACCO USER    Pt used to work at [employer] in [place]--> dealership closed  Is now working as an assistant at another car dealership    No acute c/o today  Declines flu and Tdap today    Is due for Mammogram, LDCT and FBW     #CPE   Occupation:  at car dealership     Do you take any herbs or supplements that were not prescribed by a doctor? Denies     Colon Cancer Screening: UTD, due 2027    LMP: menopause   PAP: 2022  Mammogram: Due   GYN: Dr Mcgowan     Fasting blood work: Due   HIV/HEP C Screening: Due   Last eye exam: 2024  Last dental Exam: 2024  Exercise: not regularly   Mood: no concerns   Sleep: no concerns   Vaccines: declines flu vaccine     #HTN  Rx Losartan 50 mg and Chlorthalidone 25 mg daily  BP today= 106/74  Quit smoking in Dec 2023, used Chantix     #OSTEOPOROSIS  Dx'd 2023  Rx Fosamax weekly---> started in 2023   No fractures      #POLYOSTEOARTHRITIS  + ZAHEER, neg SOO   Saw Rheumatology, Dr. Maurer in April 2023  C/o \"plaque-like lesions\" on LE, ELBOWS AND STOMACH   + erythematous rash under b/l breasts  + erythematous rash and external hemorrhoids noted around anus      #CONSTIPATION, ,CHRONIC  Taking 3 stool softeners daily  doesn't consume foods high in fiber  does not exercise     #TOBACCO DEPENDANCY  Former everyday cigarette smoker---> quit Dec 2023   1/2 ppd x 45 yrs (23 pack yrs)  LDCT 6/2023---> 3 mm nodule   Pt agrees to cont with annual surveillance       Health Maintenance Due   Topic Date Due    HIV Screening  Never done    MMR Vaccines (1 of 1 - Standard series) Never done    Pneumococcal Vaccine: Pediatrics (0 to 5 Years) and " At-Risk Patients (6 to 64 Years) (1 of 2 - PCV) Never done    Hepatitis C Screening  Never done    Diabetes Screening  Never done    Zoster Vaccines (1 of 2) Never done    RSV High Risk: (Elderly (60+) or Pregnant Population) (1 - Risk 60-74 years 1-dose series) Never done    DTaP/Tdap/Td Vaccines (2 - Td or Tdap) 03/21/2024    Influenza Vaccine (1) Never done    COVID-19 Vaccine (1 - 2024-25 season) Never done    Mammogram  11/10/2024       No Known Allergies      Patient Care Team:  REGINA Celis as PCP - General  REGINA Celis as PCP - Kevin O PCP  REGINA Celis     Review of Systems  ROS was completed and all systems are negative with the exception of what was noted in the the HPI.       Objective   Vitals:  /74   Pulse 70   Ht 1.524 m (5')   Wt 59.5 kg (131 lb 3.2 oz)   SpO2 96%   BMI 25.62 kg/m²       Current Outpatient Medications   Medication Instructions    alendronate (FOSAMAX) 70 mg, oral, Every 7 days, Take in the morning with a full glass of water, on an empty stomach, and do not take anything else by mouth or lie down for the next 30 min.    chlorthalidone (HYGROTON) 25 mg, oral, Daily    clobetasol (Temovate) 0.05 % cream Clobetasol Propionate 0.05 % External Cream   Refills: 0       Active    estrogens, conjugated, (Premarin) vaginal cream Insert into the vagina.    glucosamine sulfate 500 mg capsule 1 tablet, Daily    hydrocortisone 2.5 % cream 2 times daily    losartan (COZAAR) 50 mg, oral, Daily       Physical Exam  Vitals reviewed.   HENT:      Right Ear: Tympanic membrane normal.      Left Ear: Tympanic membrane normal.      Mouth/Throat:      Mouth: Mucous membranes are moist.   Eyes:      Conjunctiva/sclera: Conjunctivae normal.   Cardiovascular:      Pulses: Normal pulses.      Heart sounds: Normal heart sounds.   Pulmonary:      Effort: Pulmonary effort is normal.      Breath sounds: Normal breath sounds.   Skin:     General: Skin is warm and  dry.   Neurological:      Mental Status: She is alert and oriented to person, place, and time.   Psychiatric:         Mood and Affect: Mood normal.         Assessment & Plan  Annual physical exam  - Counseled on healthy diet and regular exercise  - Fall avoidance information provided  - Personalized prevention plan provided   - Mammogram ordered  - Colon and PAP are UTD  - Vaccines: need flu and Tdap   - FBW  ordered   - Pt agreeable to HIV/HEP C screening tests     Orders:    BI mammo bilateral screening tomosynthesis; Future    Comprehensive Metabolic Panel; Future    Lipid Panel; Future    Primary hypertension  Stable today: 106/74  Continue Chlorthalidone and Losartan daily  Low salt diet  Regular exercise   Orders:    chlorthalidone (Hygroton) 25 mg tablet; Take 1 tablet (25 mg) by mouth once daily.    losartan (Cozaar) 50 mg tablet; Take 1 tablet (50 mg) by mouth once daily.    Age-related osteoporosis without current pathological fracture  DEXA 2023  Continue Fosamax weekly        Incidental lung nodule, less than or equal to 3mm  Schedule LDCT for annual surveillance    Orders:    CT lung screening low dose; Future    Former tobacco use  Great job quitting   LDCT annually     Orders:    CT lung screening low dose; Future    Rash  Nonspecific RLE   See Dermatology     Orders:    Referral to Dermatology    Overweight with body mass index (BMI) 25.0-29.9  In a face to face session, I informed patient of his/her BMI > 30.  We discussed appropriate nutrition choices and exercise plan to help achieve weight reduction.   Aim for 60 gm of Protein daily  Drink 60 oz of Water daily  Exercise 60 min EVERYDAY          Encounter for hepatitis C screening test for low risk patient    Orders:    Hepatitis C Antibody; Future    Screening for HIV without presence of risk factors    Orders:    HIV 1/2 Antigen/Antibody Screen with Reflex to Confirmation; Future

## 2024-11-27 ENCOUNTER — APPOINTMENT (OUTPATIENT)
Dept: PRIMARY CARE | Facility: CLINIC | Age: 64
End: 2024-11-27
Payer: COMMERCIAL

## 2024-11-27 VITALS
SYSTOLIC BLOOD PRESSURE: 106 MMHG | HEART RATE: 70 BPM | BODY MASS INDEX: 25.76 KG/M2 | WEIGHT: 131.2 LBS | HEIGHT: 60 IN | OXYGEN SATURATION: 96 % | DIASTOLIC BLOOD PRESSURE: 74 MMHG

## 2024-11-27 DIAGNOSIS — Z11.4 SCREENING FOR HIV WITHOUT PRESENCE OF RISK FACTORS: ICD-10-CM

## 2024-11-27 DIAGNOSIS — Z00.00 ANNUAL PHYSICAL EXAM: Primary | ICD-10-CM

## 2024-11-27 DIAGNOSIS — E66.3 OVERWEIGHT WITH BODY MASS INDEX (BMI) 25.0-29.9: ICD-10-CM

## 2024-11-27 DIAGNOSIS — I10 PRIMARY HYPERTENSION: ICD-10-CM

## 2024-11-27 DIAGNOSIS — R21 RASH: ICD-10-CM

## 2024-11-27 DIAGNOSIS — Z87.891 FORMER TOBACCO USE: ICD-10-CM

## 2024-11-27 DIAGNOSIS — R91.1 INCIDENTAL LUNG NODULE, LESS THAN OR EQUAL TO 3MM: ICD-10-CM

## 2024-11-27 DIAGNOSIS — M81.0 AGE-RELATED OSTEOPOROSIS WITHOUT CURRENT PATHOLOGICAL FRACTURE: ICD-10-CM

## 2024-11-27 DIAGNOSIS — Z11.59 ENCOUNTER FOR HEPATITIS C SCREENING TEST FOR LOW RISK PATIENT: ICD-10-CM

## 2024-11-27 PROBLEM — T14.8XXA AVULSION FRACTURE: Status: RESOLVED | Noted: 2023-04-04 | Resolved: 2024-11-27

## 2024-11-27 PROCEDURE — 3074F SYST BP LT 130 MM HG: CPT | Performed by: NURSE PRACTITIONER

## 2024-11-27 PROCEDURE — 99396 PREV VISIT EST AGE 40-64: CPT | Performed by: NURSE PRACTITIONER

## 2024-11-27 PROCEDURE — 3078F DIAST BP <80 MM HG: CPT | Performed by: NURSE PRACTITIONER

## 2024-11-27 PROCEDURE — 3008F BODY MASS INDEX DOCD: CPT | Performed by: NURSE PRACTITIONER

## 2024-11-27 RX ORDER — LOSARTAN POTASSIUM 50 MG/1
50 TABLET ORAL DAILY
Qty: 90 TABLET | Refills: 1 | Status: SHIPPED | OUTPATIENT
Start: 2024-11-27

## 2024-11-27 RX ORDER — LOSARTAN POTASSIUM 50 MG/1
50 TABLET ORAL DAILY
Qty: 90 TABLET | Refills: 1 | Status: SHIPPED | OUTPATIENT
Start: 2024-11-27 | End: 2024-11-27

## 2024-11-27 RX ORDER — CHLORTHALIDONE 25 MG/1
25 TABLET ORAL DAILY
Qty: 90 TABLET | Refills: 1 | Status: SHIPPED | OUTPATIENT
Start: 2024-11-27

## 2024-11-27 NOTE — ASSESSMENT & PLAN NOTE
- Counseled on healthy diet and regular exercise  - Fall avoidance information provided  - Personalized prevention plan provided   - Mammogram ordered  - Colon and PAP are UTD  - Vaccines: need flu and Tdap   - FBW  ordered   - Pt agreeable to HIV/HEP C screening tests     Orders:    BI mammo bilateral screening tomosynthesis; Future    Comprehensive Metabolic Panel; Future    Lipid Panel; Future

## 2024-11-27 NOTE — ASSESSMENT & PLAN NOTE
In a face to face session, I informed patient of his/her BMI > 30.  We discussed appropriate nutrition choices and exercise plan to help achieve weight reduction.   Aim for 60 gm of Protein daily  Drink 60 oz of Water daily  Exercise 60 min EVERYDAY

## 2024-11-27 NOTE — PATIENT INSTRUCTIONS
Thank you for seeing me today Raquel Mercado, it was a pleasure to see you again!    Today we did your Annual Physical Exam and discussed the following:     Annual physical exam  - Counseled on healthy diet and regular exercise  - Fall avoidance information provided  - Personalized prevention plan provided   - Mammogram ordered  - Colon and PAP are UTD  - Vaccines: need flu and Tdap   - FBW  ordered   - Pt agreeable to HIV/HEP C screening tests     Orders:    BI mammo bilateral screening tomosynthesis; Future    Comprehensive Metabolic Panel; Future    Lipid Panel; Future    Primary hypertension  Stable today: 106/74  Continue Chlorthalidone and Losartan daily  Low salt diet  Regular exercise   Orders:    chlorthalidone (Hygroton) 25 mg tablet; Take 1 tablet (25 mg) by mouth once daily.    losartan (Cozaar) 50 mg tablet; Take 1 tablet (50 mg) by mouth once daily.    Age-related osteoporosis without current pathological fracture  DEXA 2023  Continue Fosamax weekly        Incidental lung nodule, less than or equal to 3mm  Schedule LDCT for annual surveillance    Orders:    CT lung screening low dose; Future    Former tobacco use  Great job quitting   LDCT annually     Orders:    CT lung screening low dose; Future    Rash  Nonspecific RLE   See Dermatology     Orders:    Referral to Dermatology    Overweight with body mass index (BMI) 25.0-29.9  In a face to face session, I informed patient of his/her BMI > 30.  We discussed appropriate nutrition choices and exercise plan to help achieve weight reduction.   Aim for 60 gm of Protein daily  Drink 60 oz of Water daily  Exercise 60 min EVERYDAY          Encounter for hepatitis C screening test for low risk patient    Orders:    Hepatitis C Antibody; Future    Screening for HIV without presence of risk factors    Orders:    HIV 1/2 Antigen/Antibody Screen with Reflex to Confirmation; Future        For assistance with scheduling referrals or consultations, please call  [phone] or [phone].    For laboratory, radiology, and other tests, please call [phone] ([phone] for pediatrics).   If you do not get results within 7-10 days, or you have any questions or concerns, please send a message, call the office ([phone]), or return to the office for a follow-up appointment.     For acute/sick visits, if you are unable to get an office visit, you can do a  On Demand Virtual Visit that is accessible via your My Chart account.  For emergencies, call 9-1-1 or go to the nearest Emergency Department.     Please schedule additional appointment(s) to address concern(s) not addressed today.    Please review prescription inserts and published information for possible adverse effects of all medications.     In general, results are discussed over the phone or via  [facility].     You can see your health information, review clinical summaries from office visits & test results online when you follow your health with MY  Chart, a personal health record.   To sign up go to [email or web address].   If you need assistance with signing up or trouble getting into your account call [facility] Patient Line 24/7 at [phone]     RTC ANNUALLY AND AS NEEDED     Have a nice day and Happy Holidays!     Erlinda Reed NP

## 2024-11-27 NOTE — ASSESSMENT & PLAN NOTE
Stable today: 106/74  Continue Chlorthalidone and Losartan daily  Low salt diet  Regular exercise   Orders:    chlorthalidone (Hygroton) 25 mg tablet; Take 1 tablet (25 mg) by mouth once daily.    losartan (Cozaar) 50 mg tablet; Take 1 tablet (50 mg) by mouth once daily.

## 2024-12-09 ENCOUNTER — APPOINTMENT (OUTPATIENT)
Dept: RADIOLOGY | Facility: HOSPITAL | Age: 64
End: 2024-12-09
Payer: COMMERCIAL

## 2024-12-18 ENCOUNTER — HOSPITAL ENCOUNTER (OUTPATIENT)
Dept: RADIOLOGY | Facility: HOSPITAL | Age: 64
Discharge: HOME | End: 2024-12-18
Payer: COMMERCIAL

## 2024-12-18 VITALS — BODY MASS INDEX: 24.54 KG/M2 | HEIGHT: 60 IN | WEIGHT: 125 LBS

## 2024-12-18 DIAGNOSIS — Z00.00 ANNUAL PHYSICAL EXAM: ICD-10-CM

## 2024-12-18 PROCEDURE — 77067 SCR MAMMO BI INCL CAD: CPT | Performed by: RADIOLOGY

## 2024-12-18 PROCEDURE — 77063 BREAST TOMOSYNTHESIS BI: CPT | Performed by: RADIOLOGY

## 2024-12-18 PROCEDURE — 77067 SCR MAMMO BI INCL CAD: CPT

## 2024-12-22 NOTE — RESULT ENCOUNTER NOTE
Raquel Mercado    Your mammogram was NEGATIVE.  We will repeat the screening in 1 year and continue screenings until age 75.    Respectfully,   Erlinda

## 2025-01-11 ENCOUNTER — LAB (OUTPATIENT)
Dept: LAB | Facility: LAB | Age: 65
End: 2025-01-11
Payer: COMMERCIAL

## 2025-01-11 DIAGNOSIS — Z00.00 ANNUAL PHYSICAL EXAM: ICD-10-CM

## 2025-01-11 DIAGNOSIS — Z11.4 SCREENING FOR HIV WITHOUT PRESENCE OF RISK FACTORS: ICD-10-CM

## 2025-01-11 DIAGNOSIS — Z11.59 ENCOUNTER FOR HEPATITIS C SCREENING TEST FOR LOW RISK PATIENT: ICD-10-CM

## 2025-01-11 LAB
ALBUMIN SERPL BCP-MCNC: 4.2 G/DL (ref 3.4–5)
ALP SERPL-CCNC: 53 U/L (ref 33–136)
ALT SERPL W P-5'-P-CCNC: 15 U/L (ref 7–45)
ANION GAP SERPL CALCULATED.3IONS-SCNC: 10 MMOL/L (ref 10–20)
AST SERPL W P-5'-P-CCNC: 18 U/L (ref 9–39)
BILIRUB SERPL-MCNC: 0.5 MG/DL (ref 0–1.2)
BUN SERPL-MCNC: 20 MG/DL (ref 6–23)
CALCIUM SERPL-MCNC: 9.4 MG/DL (ref 8.6–10.3)
CHLORIDE SERPL-SCNC: 104 MMOL/L (ref 98–107)
CHOLEST SERPL-MCNC: 191 MG/DL (ref 0–199)
CHOLEST/HDLC SERPL: 2.6 {RATIO}
CO2 SERPL-SCNC: 30 MMOL/L (ref 21–32)
CREAT SERPL-MCNC: 0.7 MG/DL (ref 0.5–1.05)
EGFRCR SERPLBLD CKD-EPI 2021: >90 ML/MIN/1.73M*2
GLUCOSE SERPL-MCNC: 85 MG/DL (ref 74–99)
HCV AB SER QL: NONREACTIVE
HDLC SERPL-MCNC: 73.3 MG/DL
HIV 1+2 AB+HIV1 P24 AG SERPL QL IA: NONREACTIVE
LDLC SERPL CALC-MCNC: 109 MG/DL
NON HDL CHOLESTEROL: 118 MG/DL (ref 0–149)
POTASSIUM SERPL-SCNC: 3.9 MMOL/L (ref 3.5–5.3)
PROT SERPL-MCNC: 6.2 G/DL (ref 6.4–8.2)
SODIUM SERPL-SCNC: 140 MMOL/L (ref 136–145)
TRIGL SERPL-MCNC: 43 MG/DL (ref 0–149)
VLDL: 9 MG/DL (ref 0–40)

## 2025-01-11 PROCEDURE — 86803 HEPATITIS C AB TEST: CPT

## 2025-01-11 PROCEDURE — 80053 COMPREHEN METABOLIC PANEL: CPT

## 2025-01-11 PROCEDURE — 80061 LIPID PANEL: CPT

## 2025-01-11 PROCEDURE — 87389 HIV-1 AG W/HIV-1&-2 AB AG IA: CPT

## 2025-01-17 ENCOUNTER — HOSPITAL ENCOUNTER (OUTPATIENT)
Dept: RADIOLOGY | Facility: CLINIC | Age: 65
Discharge: HOME | End: 2025-01-17
Payer: COMMERCIAL

## 2025-01-17 DIAGNOSIS — Z87.891 FORMER TOBACCO USE: ICD-10-CM

## 2025-01-17 DIAGNOSIS — R91.1 INCIDENTAL LUNG NODULE, LESS THAN OR EQUAL TO 3MM: ICD-10-CM

## 2025-01-17 PROCEDURE — 71271 CT THORAX LUNG CANCER SCR C-: CPT

## 2025-06-11 DIAGNOSIS — I10 PRIMARY HYPERTENSION: ICD-10-CM

## 2025-06-12 RX ORDER — LOSARTAN POTASSIUM 50 MG/1
50 TABLET ORAL DAILY
Qty: 90 TABLET | Refills: 1 | Status: SHIPPED | OUTPATIENT
Start: 2025-06-12

## 2025-11-14 ENCOUNTER — APPOINTMENT (OUTPATIENT)
Dept: PRIMARY CARE | Facility: CLINIC | Age: 65
End: 2025-11-14
Payer: COMMERCIAL